# Patient Record
Sex: FEMALE | Race: BLACK OR AFRICAN AMERICAN | NOT HISPANIC OR LATINO | Employment: FULL TIME | URBAN - METROPOLITAN AREA
[De-identification: names, ages, dates, MRNs, and addresses within clinical notes are randomized per-mention and may not be internally consistent; named-entity substitution may affect disease eponyms.]

---

## 2022-04-09 ENCOUNTER — HOSPITAL ENCOUNTER (EMERGENCY)
Facility: MEDICAL CENTER | Age: 27
End: 2022-04-09
Attending: EMERGENCY MEDICINE
Payer: MEDICAID

## 2022-04-09 VITALS
RESPIRATION RATE: 14 BRPM | HEART RATE: 74 BPM | DIASTOLIC BLOOD PRESSURE: 52 MMHG | SYSTOLIC BLOOD PRESSURE: 111 MMHG | OXYGEN SATURATION: 97 % | TEMPERATURE: 98.6 F | WEIGHT: 103.62 LBS | BODY MASS INDEX: 17.26 KG/M2 | HEIGHT: 65 IN

## 2022-04-09 DIAGNOSIS — N39.0 ACUTE UTI: ICD-10-CM

## 2022-04-09 LAB
APPEARANCE UR: ABNORMAL
BACTERIA #/AREA URNS HPF: ABNORMAL /HPF
BILIRUB UR QL STRIP.AUTO: NEGATIVE
COLOR UR: YELLOW
EPI CELLS #/AREA URNS HPF: NEGATIVE /HPF
GLUCOSE UR STRIP.AUTO-MCNC: NEGATIVE MG/DL
HCG UR QL: NEGATIVE
HYALINE CASTS #/AREA URNS LPF: ABNORMAL /LPF
KETONES UR STRIP.AUTO-MCNC: NEGATIVE MG/DL
LEUKOCYTE ESTERASE UR QL STRIP.AUTO: ABNORMAL
MICRO URNS: ABNORMAL
NITRITE UR QL STRIP.AUTO: POSITIVE
PH UR STRIP.AUTO: 6 [PH] (ref 5–8)
PROT UR QL STRIP: 100 MG/DL
RBC # URNS HPF: ABNORMAL /HPF
RBC UR QL AUTO: ABNORMAL
SP GR UR STRIP.AUTO: >=1.03
WBC #/AREA URNS HPF: ABNORMAL /HPF

## 2022-04-09 PROCEDURE — 700102 HCHG RX REV CODE 250 W/ 637 OVERRIDE(OP): Performed by: EMERGENCY MEDICINE

## 2022-04-09 PROCEDURE — 81001 URINALYSIS AUTO W/SCOPE: CPT

## 2022-04-09 PROCEDURE — A9270 NON-COVERED ITEM OR SERVICE: HCPCS | Performed by: EMERGENCY MEDICINE

## 2022-04-09 PROCEDURE — 87077 CULTURE AEROBIC IDENTIFY: CPT

## 2022-04-09 PROCEDURE — 99284 EMERGENCY DEPT VISIT MOD MDM: CPT

## 2022-04-09 PROCEDURE — 87186 SC STD MICRODIL/AGAR DIL: CPT

## 2022-04-09 PROCEDURE — 81025 URINE PREGNANCY TEST: CPT

## 2022-04-09 PROCEDURE — 87086 URINE CULTURE/COLONY COUNT: CPT

## 2022-04-09 RX ORDER — PHENAZOPYRIDINE HYDROCHLORIDE 200 MG/1
200 TABLET, FILM COATED ORAL ONCE
Status: COMPLETED | OUTPATIENT
Start: 2022-04-09 | End: 2022-04-09

## 2022-04-09 RX ORDER — PHENAZOPYRIDINE HYDROCHLORIDE 200 MG/1
100 TABLET, FILM COATED ORAL ONCE
Status: DISCONTINUED | OUTPATIENT
Start: 2022-04-09 | End: 2022-04-09

## 2022-04-09 RX ORDER — PHENAZOPYRIDINE HYDROCHLORIDE 100 MG/1
100 TABLET, FILM COATED ORAL 3 TIMES DAILY PRN
Qty: 6 TABLET | Refills: 0 | Status: SHIPPED | OUTPATIENT
Start: 2022-04-09 | End: 2022-04-09 | Stop reason: SDUPTHER

## 2022-04-09 RX ORDER — SULFAMETHOXAZOLE AND TRIMETHOPRIM 800; 160 MG/1; MG/1
1 TABLET ORAL 2 TIMES DAILY
Qty: 14 TABLET | Refills: 0 | Status: SHIPPED | OUTPATIENT
Start: 2022-04-09 | End: 2022-04-16

## 2022-04-09 RX ORDER — SULFAMETHOXAZOLE AND TRIMETHOPRIM 800; 160 MG/1; MG/1
1 TABLET ORAL 2 TIMES DAILY
Qty: 14 TABLET | Refills: 0 | Status: SHIPPED | OUTPATIENT
Start: 2022-04-09 | End: 2022-04-09 | Stop reason: SDUPTHER

## 2022-04-09 RX ORDER — SULFAMETHOXAZOLE AND TRIMETHOPRIM 800; 160 MG/1; MG/1
1 TABLET ORAL ONCE
Status: COMPLETED | OUTPATIENT
Start: 2022-04-09 | End: 2022-04-09

## 2022-04-09 RX ORDER — IBUPROFEN 600 MG/1
600 TABLET ORAL ONCE
Status: COMPLETED | OUTPATIENT
Start: 2022-04-09 | End: 2022-04-09

## 2022-04-09 RX ORDER — PHENAZOPYRIDINE HYDROCHLORIDE 100 MG/1
100 TABLET, FILM COATED ORAL 3 TIMES DAILY PRN
Qty: 6 TABLET | Refills: 0 | Status: SHIPPED | OUTPATIENT
Start: 2022-04-09

## 2022-04-09 RX ORDER — SULFAMETHOXAZOLE AND TRIMETHOPRIM 800; 160 MG/1; MG/1
1 TABLET ORAL ONCE
Status: DISCONTINUED | OUTPATIENT
Start: 2022-04-09 | End: 2022-04-09

## 2022-04-09 RX ORDER — ACETAMINOPHEN 500 MG
1000 TABLET ORAL ONCE
Status: COMPLETED | OUTPATIENT
Start: 2022-04-09 | End: 2022-04-09

## 2022-04-09 RX ADMIN — IBUPROFEN 600 MG: 600 TABLET ORAL at 01:25

## 2022-04-09 RX ADMIN — SULFAMETHOXAZOLE AND TRIMETHOPRIM 1 TABLET: 800; 160 TABLET ORAL at 01:32

## 2022-04-09 RX ADMIN — PHENAZOPYRIDINE HYDROCHLORIDE 200 MG: 200 TABLET ORAL at 01:25

## 2022-04-09 RX ADMIN — ACETAMINOPHEN 1000 MG: 500 TABLET, FILM COATED ORAL at 01:25

## 2022-04-09 NOTE — ED TRIAGE NOTES
"Chief Complaint   Patient presents with   • Abdominal Pain     Lower abd pain, worse with urination. Ongoing for 2 days.   • Dysuria     ED Triage Vitals   Enc Vitals Group      Blood Pressure 04/09/22 0030 100/67      Pulse 04/09/22 0030 86      Respiration 04/09/22 0030 12      Temperature 04/09/22 0030 37 °C (98.6 °F)      Temp src 04/09/22 0030 Temporal      Pulse Oximetry 04/09/22 0030 99 %      Weight 04/09/22 0038 47 kg (103 lb 9.9 oz)      Height 04/09/22 0030 1.651 m (5' 5\")     "

## 2022-04-09 NOTE — LETTER
4/11/2022               Allegra Lux  [address]  [address] [address]  [facility] [address]        Dear Allegra (MR#[id number])    This letter is sent in regards to your recent visit to the [facility] on 4/9/2022. During the visit, tests were performed to assist the physician in your medical diagnosis. A review of your tests requires that we notify you of the following:    Your urine culture was POSITIVE for a bacteria called Klebsiella pneumoniae. The antibiotic prescribed for you (sulfamethoxazole-trimethoprim) should be active to treat this bacteria. It is important that you continue taking your antibiotic until it is finished.     Please feel free to contact me at the number below if you have any questions or concerns. Thank you for your cooperation in the matter.    Sincerely,  ED Culture Follow-Up Staff  Juli Melgar, PharmD, BCPS   PGY2 Infectious Diseases Pharmacy Resident      Former[facility], Emergency Department  [address] [place] [id number] [phone] (Juli's Phone Number)  [phone] (ED Culture Line)

## 2022-04-09 NOTE — ED PROVIDER NOTES
"ED Provider Note      Means of Arrival: Private Vehicle  History obtained from: Patient    CHIEF COMPLAINT  Chief Complaint   Patient presents with   • Abdominal Pain     Lower abd pain, worse with urination. Ongoing for 2 days.   • Dysuria       HPI  Allegra Hill is a 27 y.o. female who presents with several days of dysuria, lower abdominal pain, low back pain, chills.  She denies any vomiting, vaginal discharge, diarrhea, chest pain, shortness of breath.  She denies any upper abdominal pain.  She reports urinary frequency and severe dysuria.  She denies any hematuria.    REVIEW OF SYSTEMS  CONSTITUTIONAL:  No fever.  CARDIOVASCULAR:  No chest discomfort.  RESPIRATORY:  No pleuritic chest pain.  GASTROINTESTINAL: See HPI  GENITOURINARY:    See HPI  MUSCULOSKELETAL:  No arthralgia.  SKIN:  No rash or suspicious lesions.  NEUROLOGIC:   No headache.    See HPI for further details.   All other systems are negative.     PAST MEDICAL HISTORY  Past Medical History:   Diagnosis Date   • Patient denies medical problems        FAMILY HISTORY  History reviewed. No pertinent family history.    SOCIAL HISTORY   reports that she has never smoked. She has never used smokeless tobacco. She reports current alcohol use. She reports that she does not use drugs.    SURGICAL HISTORY  History reviewed. No pertinent surgical history.    CURRENT MEDICATIONS  Home Medications     Reviewed by Imelda Salgado R.N. (Registered Nurse) on 04/09/22 at 0124  Med List Status: Complete   Medication Last Dose Status        Patient Tang Taking any Medications                       ALLERGIES  No Known Allergies    PHYSICAL EXAM  VITAL SIGNS: /52   Pulse 74   Temp 37 °C (98.6 °F)   Resp 14   Ht 1.651 m (5' 5\")   Wt 47 kg (103 lb 9.9 oz)   LMP 04/01/2022 (Within Weeks)   SpO2 97%   BMI 17.24 kg/m²    Gen: Alert  HENT: ATNC  Eyes: Normal conjunctiva  Neck: trachea midline  Resp: no respiratory distress  CV: No JVD, RRR  Abd: " non-distended.  Tenderness palpation in suprapubic region  : Mild bilateral CVA tenderness  Ext: No deformities  Psych: normal mood  Neuro: speech fluent       LABS  Labs Reviewed   URINALYSIS,CULTURE IF INDICATED - Abnormal; Notable for the following components:       Result Value    Character Cloudy (*)     Protein 100 (*)     Nitrite Positive (*)     Leukocyte Esterase Moderate (*)     Occult Blood Moderate (*)     All other components within normal limits    Narrative:     Indication for culture:->Patient WITHOUT an indwelling Romeo  catheter in place with new onset of Dysuria, Frequency,  Urgency, and/or Suprapubic pain   URINE MICROSCOPIC (W/UA) - Abnormal; Notable for the following components:    WBC 20-50 (*)     RBC 5-10 (*)     Bacteria Moderate (*)     All other components within normal limits    Narrative:     Indication for culture:->Patient WITHOUT an indwelling Romeo  catheter in place with new onset of Dysuria, Frequency,  Urgency, and/or Suprapubic pain   HCG QUALITATIVE UR    Narrative:     Indication for culture:->Patient WITHOUT an indwelling Romeo  catheter in place with new onset of Dysuria, Frequency,  Urgency, and/or Suprapubic pain   URINE CULTURE(NEW)    Narrative:     Indication for culture:->Patient WITHOUT an indwelling Romeo  catheter in place with new onset of Dysuria, Frequency,  Urgency, and/or Suprapubic pain        COURSE & MEDICAL DECISION MAKING  Pertinent Labs & Imaging studies reviewed. (See chart for details)    Patient presents with dysuria, chills, low back pain.  Urine shows clear urinary tract infection.  Bedside ultrasound demonstrates no hydronephrosis to suggest obstructive process.  Patient otherwise well-appearing.  No signs of sepsis.  She was offered STI testing but declines.  Pregnancy test is negative.  Will treat empirically for pyelonephritis.  Urine will be sent for culture.    The patient was given return precautions, anticipatory guidance, and the  opportunity to ask questions prior to discharge.     Appropriate PPE were worn at this encounter.     FINAL IMPRESSION  1. Acute UTI           DISPOSITION:  Patient will be discharged home in stable condition.    FOLLOW UP:  Your regular doctor.  To establish a primary care provider within our system, please call [phone]          [facility], Emergency Dept  [address]  [place] [address] [phone]    If symptoms worsen      OUTPATIENT MEDICATIONS:  Discharge Medication List as of 4/9/2022  1:38 AM      START taking these medications    Details   sulfamethoxazole-trimethoprim (BACTRIM DS) 800-160 MG tablet Take 1 Tablet by mouth 2 times a day for 7 days., Disp-14 Tablet, R-0, Print Rx Paper      phenazopyridine (PYRIDIUM) 100 MG Tab Take 1 Tablet by mouth 3 times a day as needed., Disp-6 Tablet, R-0, Print Rx Paper             This dictation was created using voice recognition software. The accuracy of the dictation is limited to the abilities of the software. I expect there may be some errors of grammar and possibly content. The nursing notes were reviewed and certain aspects of this information were incorporated into this note.

## 2022-04-09 NOTE — DISCHARGE INSTRUCTIONS
Your urinalysis demonstrated a urinary tract infection. You are being sent home with a medication for this. A culture was sent to ensure that you were given the correct medication. Please take this as prescribed and follow up with your regular doctor.         For pain you can take acetaminophen (Tylenol), 1000mg every 8 hours as needed for pain. Do not take more than 3000mg of acetaminophen in any 24 hour period. You can also take  ibuprofen (Motrin), 600mg every 6 hours as needed for pain (take with food to avoid GI upset).  Taking these medications regularly during the day can be very effective in controlling pain.    Please return to the ED if you develop fever, flank pain, or other concerning findings.

## 2022-04-11 LAB
BACTERIA UR CULT: ABNORMAL
BACTERIA UR CULT: ABNORMAL
SIGNIFICANT IND 70042: ABNORMAL
SITE SITE: ABNORMAL
SOURCE SOURCE: ABNORMAL

## 2022-04-11 NOTE — ED NOTES
"ED Positive Culture Follow-up/Notification Note:    Date: 4/11/2022     Patient seen in the ED on 4/9/2022 for dysuria, lower abdominal pain, low back pain and chills .   1. Acute UTI       Discharge Medication List as of 4/9/2022  1:38 AM      START taking these medications    Details   sulfamethoxazole-trimethoprim (BACTRIM DS) 800-160 MG tablet Take 1 Tablet by mouth 2 times a day for 7 days., Disp-14 Tablet, R-0, Print Rx Paper      phenazopyridine (PYRIDIUM) 100 MG Tab Take 1 Tablet by mouth 3 times a day as needed., Disp-6 Tablet, R-0, Print Rx Paper             Allergies: Patient has no known allergies.     Vitals:    04/09/22 0030 04/09/22 0038 04/09/22 0133 04/09/22 0136   BP: 100/67   111/52   Pulse: 86   74   Resp: 12  14    Temp: 37 °C (98.6 °F)  37 °C (98.6 °F)    TempSrc: Temporal      SpO2: 99%   97%   Weight:  47 kg (103 lb 9.9 oz)     Height: 1.651 m (5' 5\")          Final cultures:   Results     Procedure Component Value Units Date/Time    URINE CULTURE(NEW) [[id number]]  (Abnormal)  (Susceptibility) Collected: 04/09/22 0100    Order Status: Completed Specimen: Urine Updated: 04/11/22 0932     Significant Indicator POS     Source UR     Site -     Culture Result -      Klebsiella pneumoniae  ,000 cfu/mL      Narrative:      Indication for culture:->Patient WITHOUT an indwelling Romeo  catheter in place with new onset of Dysuria, Frequency,  Urgency, and/or Suprapubic pain    Susceptibility     Klebsiella pneumoniae (1)     Antibiotic Interpretation Microscan   Method Status    Amikacin  [*]  Sensitive <=16 mcg/mL JADE Final    Amoxicillin/CA  [*]  Sensitive <=8/4 mcg/mL JADE Final    Aztreonam  [*]  Sensitive <=4 mcg/mL JADE Final    Ceftolozane+Tazobactam  [*]  Sensitive <=2 mcg/mL JADE Final    Ceftriaxone Sensitive <=1 mcg/mL JADE Final    Ceftazidime  [*]  Sensitive <=1 mcg/mL JADE Final    Cefazolin Sensitive <=2 mcg/mL JADE Final     Breakpoints when Cefazolin is used for therapy of " infections  other than uncomplicated UTIs due to Enterobacterales are as  follows:  JADE and Interpretation:  <=2 S  4 I  >=8 R       Ciprofloxacin Sensitive <=0.25 mcg/mL JADE Final    Cefepime Sensitive <=2 mcg/mL JADE Final    Cefuroxime Sensitive <=4 mcg/mL JADE Final    Ceftazidime+Avibactam  [*]  Sensitive <=4 mcg/mL JADE Final    Ampicillin/sulbactam Sensitive <=4/2 mcg/mL JADE Final    Ertapenem  [*]  Sensitive <=0.5 mcg/mL JADE Final    Tobramycin Sensitive <=2 mcg/mL JADE Final    Nitrofurantoin Intermediate 64 mcg/mL JADE Final    Gentamicin Sensitive <=2 mcg/mL JADE Final    Imipenem  [*]  Sensitive <=1 mcg/mL JADE Final    Levofloxacin Sensitive <=0.5 mcg/mL JADE Final    Meropenem  [*]  Sensitive <=1 mcg/mL JADE Final    Meropenem/Vaborbactam  [*]  Sensitive <=2 mcg/mL JADE Final    Minocycline Sensitive <=4 mcg/mL JADE Final    Pip/Tazobactam Sensitive <=8 mcg/mL JADE Final    Trimeth/Sulfa Sensitive <=0.5/9.5 mcg/mL JADE Final    Tetracycline  [*]  Sensitive <=4 mcg/mL JADE Final    Tigecycline Sensitive <=2 mcg/mL JADE Final           [*]  Suppressed Antibiotic                 URINALYSIS CULTURE, IF INDICATED [[id number]]  (Abnormal) Collected: 04/09/22 0100    Order Status: Completed Specimen: Urine Updated: 04/09/22 0120     Color Yellow     Character Cloudy     Specific Gravity >=1.030     Ph 6.0     Glucose Negative mg/dL      Ketones Negative mg/dL      Protein 100 mg/dL      Bilirubin Negative     Nitrite Positive     Leukocyte Esterase Moderate     Occult Blood Moderate     Micro Urine Req Microscopic    Narrative:      Indication for culture:->Patient WITHOUT an indwelling Romeo  catheter in place with new onset of Dysuria, Frequency,  Urgency, and/or Suprapubic pain          Plan:   Appropriate antibiotic therapy prescribed. No changes required based upon culture result. Attempted to call patient to discuss symptoms, LVM. Sent letter to patient to notify of positive culture result and encourage compliance with  prescribed antibiotics.     Juli Melgar, PharmD, BCPS   PGY2 Infectious Diseases Pharmacy Resident